# Patient Record
(demographics unavailable — no encounter records)

---

## 2025-03-14 NOTE — REASON FOR VISIT
[FreeTextEntry2] : 03/14/2025: 76-year-old female here today for new Injury of the B/L RF pain. There is swelling in both fingers, and they lock. She noticed this happening about two weeks ago.

## 2025-03-14 NOTE — PHYSICAL EXAM
[de-identified] : R hand: Mild swelling ring finger Tender 4th A1 pulley Decreased ring ROM +ring triggering  L hand: Mild swelling ring finger Tender 4th A1 pulley Decreased ring ROM +ring triggering

## 2025-03-14 NOTE — ASSESSMENT
[FreeTextEntry1] :  Trigger finger is a progressive problem that once occurs will be a chronic issue that will likely continue until surgical treatment is necessary. Trigger finger creates a chronic change to the tendon/pulley system in the hand that will be present until surgical release. Treatment options for trigger finger include OTC NSAIDS, prescription NSAIDS, ice, splinting, OT, cortisone injection, and surgical release  Medrol Dose Pavel prescribed for pain, swelling and inflammation. Patient instructed to take as directed on packaging to be completed in 6 days.  R RF tendon sheath injection was performed at the A1 pulley because of pain and inflammation under aseptic conditions with betadine and alcohol Anesthesia: ethyl chloride sprayed topically Celestone 6mg/1cc: An injection of Celestone 1cc Lidocaine: An injection of Lidocaine 1% 1cc Marcaine: An injection of Marcaine 0.5% 1cc 25G needle   The risks, benefits, and alternatives to cortisone injection were explained in full to the patient. Risks outlined include but are not limited to infection, sepsis, bleeding, scarring, skin discoloration, temporary increase in pain, syncopal episode, failure to resolve symptoms, allergic reaction, symptom recurrence, and elevation of blood sugar in diabetics. Patient understood the risks. All questions were answered. After discussion of options, patient verbally consented to an injection. Sterile prep was done of the injection site. Patient tolerated the procedure well. Advised to ice the injection site this evening.  L RF tendon sheath injection was performed at the A1 pulley because of pain and inflammation under aseptic conditions with betadine and alcohol Anesthesia: ethyl chloride sprayed topically Celestone 6mg/1cc: An injection of Celestone 1cc Lidocaine: An injection of Lidocaine 1% 1cc Marcaine: An injection of Marcaine 0.5% 1cc 25G needle   The risks, benefits, and alternatives to cortisone injection were explained in full to the patient. Risks outlined include but are not limited to infection, sepsis, bleeding, scarring, skin discoloration, temporary increase in pain, syncopal episode, failure to resolve symptoms, allergic reaction, symptom recurrence, and elevation of blood sugar in diabetics. Patient understood the risks. All questions were answered. After discussion of options, patient verbally consented to an injection. Sterile prep was done of the injection site. Patient tolerated the procedure well. Advised to ice the injection site this evening.